# Patient Record
Sex: MALE | URBAN - METROPOLITAN AREA
[De-identification: names, ages, dates, MRNs, and addresses within clinical notes are randomized per-mention and may not be internally consistent; named-entity substitution may affect disease eponyms.]

---

## 2023-07-09 ENCOUNTER — CLAIMS CREATED FROM THE CLAIM WINDOW (OUTPATIENT)
Dept: URBAN - METROPOLITAN AREA MEDICAL CENTER 27 | Facility: MEDICAL CENTER | Age: 58
End: 2023-07-09
Payer: COMMERCIAL

## 2023-07-09 ENCOUNTER — CLAIMS CREATED FROM THE CLAIM WINDOW (OUTPATIENT)
Dept: URBAN - METROPOLITAN AREA MEDICAL CENTER 27 | Facility: MEDICAL CENTER | Age: 58
End: 2023-07-09

## 2023-07-09 DIAGNOSIS — K70.10 ACUTE ALCOHOLIC HEPATITIS: ICD-10-CM

## 2023-07-09 DIAGNOSIS — K85.20 ACUTE ALCOHOLIC PANCREATITIS: ICD-10-CM

## 2023-07-09 DIAGNOSIS — D62 ABLA (ACUTE BLOOD LOSS ANEMIA): ICD-10-CM

## 2023-07-09 DIAGNOSIS — K92.1 ACUTE MELENA: ICD-10-CM

## 2023-07-09 PROCEDURE — G8427 DOCREV CUR MEDS BY ELIG CLIN: HCPCS | Performed by: INTERNAL MEDICINE

## 2023-07-09 PROCEDURE — 99222 1ST HOSP IP/OBS MODERATE 55: CPT | Performed by: INTERNAL MEDICINE

## 2023-07-09 PROCEDURE — 99232 SBSQ HOSP IP/OBS MODERATE 35: CPT | Performed by: INTERNAL MEDICINE

## 2023-07-14 ENCOUNTER — OUT OF OFFICE VISIT (OUTPATIENT)
Dept: URBAN - METROPOLITAN AREA MEDICAL CENTER 27 | Facility: MEDICAL CENTER | Age: 58
End: 2023-07-14
Payer: COMMERCIAL

## 2023-07-14 DIAGNOSIS — K29.20 ACUTE ALCOHOLIC GASTRITIS WITHOUT HEMORRHAGE: ICD-10-CM

## 2023-07-14 DIAGNOSIS — K29.80 ACUTE DUODENITIS: ICD-10-CM

## 2023-07-14 DIAGNOSIS — K22.2 ACQUIRED ESOPHAGEAL RING: ICD-10-CM

## 2023-07-14 PROCEDURE — 43249 ESOPH EGD DILATION <30 MM: CPT | Performed by: STUDENT IN AN ORGANIZED HEALTH CARE EDUCATION/TRAINING PROGRAM

## 2024-08-26 ENCOUNTER — OFFICE VISIT (OUTPATIENT)
Dept: URBAN - METROPOLITAN AREA CLINIC 54 | Facility: CLINIC | Age: 59
End: 2024-08-26
Payer: COMMERCIAL

## 2024-08-26 ENCOUNTER — LAB OUTSIDE AN ENCOUNTER (OUTPATIENT)
Dept: URBAN - METROPOLITAN AREA CLINIC 54 | Facility: CLINIC | Age: 59
End: 2024-08-26

## 2024-08-26 ENCOUNTER — DASHBOARD ENCOUNTERS (OUTPATIENT)
Age: 59
End: 2024-08-26

## 2024-08-26 VITALS
DIASTOLIC BLOOD PRESSURE: 77 MMHG | BODY MASS INDEX: 30.26 KG/M2 | WEIGHT: 211.4 LBS | HEART RATE: 87 BPM | TEMPERATURE: 97.4 F | HEIGHT: 70 IN | SYSTOLIC BLOOD PRESSURE: 129 MMHG

## 2024-08-26 DIAGNOSIS — D64.89 ANEMIA DUE TO OTHER CAUSE: ICD-10-CM

## 2024-08-26 DIAGNOSIS — K70.30 ALCOHOLIC CIRRHOSIS, UNSPECIFIED WHETHER ASCITES PRESENT: ICD-10-CM

## 2024-08-26 DIAGNOSIS — Z85.51 HISTORY OF BLADDER CANCER: ICD-10-CM

## 2024-08-26 PROBLEM — 420054005: Status: ACTIVE | Noted: 2024-08-26

## 2024-08-26 PROBLEM — 266987004: Status: ACTIVE | Noted: 2024-08-26

## 2024-08-26 PROCEDURE — 99205 OFFICE O/P NEW HI 60 MIN: CPT | Performed by: PHYSICIAN ASSISTANT

## 2024-08-26 PROCEDURE — 99245 OFF/OP CONSLTJ NEW/EST HI 55: CPT | Performed by: PHYSICIAN ASSISTANT

## 2024-08-26 RX ORDER — CEFDINIR 300 MG/1
AS DIRECTED CAPSULE ORAL
Status: ACTIVE | COMMUNITY

## 2024-08-26 RX ORDER — LACTULOSE 10 G/15ML
15 ML AS NEEDED SOLUTION ORAL ONCE A DAY
Status: ACTIVE | COMMUNITY

## 2024-08-26 RX ORDER — ESCITALOPRAM OXALATE 20 MG/1
1 TABLET TABLET, FILM COATED ORAL ONCE A DAY
Status: ACTIVE | COMMUNITY

## 2024-08-26 RX ORDER — FERROUS SULFATE 325(65) MG
1 TABLET TABLET ORAL
Status: ACTIVE | COMMUNITY

## 2024-08-26 RX ORDER — PANTOPRAZOLE SODIUM 40 MG/1
1 TABLET TABLET, DELAYED RELEASE ORAL ONCE A DAY
Status: ACTIVE | COMMUNITY

## 2024-08-26 RX ORDER — QUETIAPINE 100 MG/1
1 TABLET TABLET, FILM COATED ORAL ONCE A DAY
Status: ACTIVE | COMMUNITY

## 2024-08-26 NOTE — PHYSICAL EXAM GASTROINTESTINAL
Abdomen , soft, nontender, nondistended , no guarding or rigidity , no masses palpable , excessive abdominal fat, midline hernia.

## 2024-08-27 LAB
AFP, SERUM, TUMOR MARKER: 1.8
ALBUMIN: 4.4
ALKALINE PHOSPHATASE: 93
ALT (SGPT): 32
AST (SGOT): 34
BASO (ABSOLUTE): 0
BASOS: 1
BILIRUBIN, TOTAL: 0.4
BUN/CREATININE RATIO: 18
BUN: 27
CALCIUM: 9.2
CARBON DIOXIDE, TOTAL: 22
CHLORIDE: 105
CREATININE: 1.5
EGFR: 53
EOS (ABSOLUTE): 0.2
EOS: 3
FERRITIN, SERUM: 26
FOLATE (FOLIC ACID), SERUM: 19.8
GLOBULIN, TOTAL: 2.4
GLUCOSE: 120
HEMATOCRIT: 35.9
HEMATOLOGY COMMENTS:: (no result)
HEMOGLOBIN: 10.7
IMMATURE CELLS: (no result)
IMMATURE GRANS (ABS): 0
IMMATURE GRANULOCYTES: 0
INR: 1
IRON BIND.CAP.(TIBC): 518
IRON SATURATION: 3
IRON: 17
LYMPHS (ABSOLUTE): 0.7
LYMPHS: 14
MCH: 25.9
MCHC: 29.8
MCV: 87
MONOCYTES(ABSOLUTE): 0.4
MONOCYTES: 8
NEUTROPHILS (ABSOLUTE): 3.6
NEUTROPHILS: 74
NRBC: (no result)
PLATELETS: 121
POTASSIUM: 4.5
PROTEIN, TOTAL: 6.8
PROTHROMBIN TIME: 11.4
RBC: 4.13
RDW: 16.5
SODIUM: 138
UIBC: 501
VITAMIN B12: >2000
WBC: 5

## 2024-09-02 ENCOUNTER — TELEPHONE ENCOUNTER (OUTPATIENT)
Dept: URBAN - METROPOLITAN AREA CLINIC 54 | Facility: CLINIC | Age: 59
End: 2024-09-02

## 2024-09-06 ENCOUNTER — TELEPHONE ENCOUNTER (OUTPATIENT)
Dept: URBAN - METROPOLITAN AREA CLINIC 82 | Facility: CLINIC | Age: 59
End: 2024-09-06

## 2025-02-24 ENCOUNTER — OFFICE VISIT (OUTPATIENT)
Dept: URBAN - METROPOLITAN AREA CLINIC 54 | Facility: CLINIC | Age: 60
End: 2025-02-24